# Patient Record
Sex: MALE | Race: OTHER | Employment: FULL TIME | URBAN - METROPOLITAN AREA
[De-identification: names, ages, dates, MRNs, and addresses within clinical notes are randomized per-mention and may not be internally consistent; named-entity substitution may affect disease eponyms.]

---

## 2020-04-28 RX ORDER — SODIUM CHLORIDE 0.9 % (FLUSH) 0.9 %
5-40 SYRINGE (ML) INJECTION EVERY 8 HOURS
Status: CANCELLED | OUTPATIENT
Start: 2020-04-28

## 2020-04-28 RX ORDER — SODIUM CHLORIDE 0.9 % (FLUSH) 0.9 %
5-40 SYRINGE (ML) INJECTION AS NEEDED
Status: CANCELLED | OUTPATIENT
Start: 2020-04-28

## 2020-05-05 LAB — SARS-COV-2, NAA: NOT DETECTED

## 2020-05-07 ENCOUNTER — ANESTHESIA EVENT (OUTPATIENT)
Dept: SURGERY | Age: 50
End: 2020-05-07
Payer: COMMERCIAL

## 2020-05-07 NOTE — PERIOP NOTES
Verified with [employer] that patient COVID lab result is \"not detected\". Results faxed.

## 2020-05-07 NOTE — ANESTHESIA PREPROCEDURE EVALUATION
Relevant Problems   No relevant active problems       Anesthetic History               Review of Systems / Medical History  Patient summary reviewed, nursing notes reviewed and pertinent labs reviewed    Pulmonary                   Neuro/Psych              Cardiovascular              Hyperlipidemia    Exercise tolerance: >4 METS     GI/Hepatic/Renal                Endo/Other    Diabetes: well controlled, type 2    Obesity     Other Findings            Physical Exam    Airway  Mallampati: I  TM Distance: > 6 cm  Neck ROM: normal range of motion   Mouth opening: Normal     Cardiovascular  Regular rate and rhythm,  S1 and S2 normal,  no murmur, click, rub, or gallop             Dental  No notable dental hx       Pulmonary  Breath sounds clear to auscultation               Abdominal  GI exam deferred       Other Findings            Anesthetic Plan    ASA: 3  Anesthesia type: general            Anesthetic plan and risks discussed with: Patient

## 2020-05-08 ENCOUNTER — ANESTHESIA (OUTPATIENT)
Dept: SURGERY | Age: 50
End: 2020-05-08
Payer: COMMERCIAL

## 2020-05-08 ENCOUNTER — HOSPITAL ENCOUNTER (OUTPATIENT)
Age: 50
Setting detail: OUTPATIENT SURGERY
Discharge: HOME | End: 2020-05-08
Attending: ORTHOPAEDIC SURGERY | Admitting: ORTHOPAEDIC SURGERY
Payer: COMMERCIAL

## 2020-05-08 VITALS
SYSTOLIC BLOOD PRESSURE: 141 MMHG | HEART RATE: 71 BPM | DIASTOLIC BLOOD PRESSURE: 82 MMHG | RESPIRATION RATE: 16 BRPM | TEMPERATURE: 97.6 F | OXYGEN SATURATION: 96 %

## 2020-05-08 LAB
GLUCOSE BLD STRIP.AUTO-MCNC: 180 MG/DL (ref 65–100)
POTASSIUM BLD-SCNC: 3.6 MMOL/L (ref 3.5–5.1)

## 2020-05-08 PROCEDURE — 82962 GLUCOSE BLOOD TEST: CPT

## 2020-05-08 PROCEDURE — 76010000138 HC OR TIME 0.5 TO 1 HR: Performed by: ORTHOPAEDIC SURGERY

## 2020-05-08 PROCEDURE — 74011250636 HC RX REV CODE- 250/636: Performed by: ANESTHESIOLOGY

## 2020-05-08 PROCEDURE — 77030000032 HC CUF TRNQT ZIMM -B: Performed by: ORTHOPAEDIC SURGERY

## 2020-05-08 PROCEDURE — 74011250636 HC RX REV CODE- 250/636: Performed by: ORTHOPAEDIC SURGERY

## 2020-05-08 PROCEDURE — 77030018836 HC SOL IRR NACL ICUM -A: Performed by: ORTHOPAEDIC SURGERY

## 2020-05-08 PROCEDURE — 77030027385 HC BLD SHV ARTHSCP STRY -B: Performed by: ORTHOPAEDIC SURGERY

## 2020-05-08 PROCEDURE — 77030038012 HC WND COBLATN S&N -F: Performed by: ORTHOPAEDIC SURGERY

## 2020-05-08 PROCEDURE — 74011250636 HC RX REV CODE- 250/636: Performed by: REGISTERED NURSE

## 2020-05-08 PROCEDURE — 74011250637 HC RX REV CODE- 250/637: Performed by: ANESTHESIOLOGY

## 2020-05-08 PROCEDURE — 76060000032 HC ANESTHESIA 0.5 TO 1 HR: Performed by: ORTHOPAEDIC SURGERY

## 2020-05-08 PROCEDURE — 76210000006 HC OR PH I REC 0.5 TO 1 HR: Performed by: ORTHOPAEDIC SURGERY

## 2020-05-08 PROCEDURE — 74011000250 HC RX REV CODE- 250: Performed by: REGISTERED NURSE

## 2020-05-08 PROCEDURE — 77030010509 HC AIRWY LMA MSK TELE -A: Performed by: REGISTERED NURSE

## 2020-05-08 PROCEDURE — 76210000020 HC REC RM PH II FIRST 0.5 HR: Performed by: ORTHOPAEDIC SURGERY

## 2020-05-08 PROCEDURE — 84132 ASSAY OF SERUM POTASSIUM: CPT

## 2020-05-08 PROCEDURE — 77030006891 HC BLD SHV RESECT STRY -B: Performed by: ORTHOPAEDIC SURGERY

## 2020-05-08 RX ORDER — KETOROLAC TROMETHAMINE 30 MG/ML
30 INJECTION, SOLUTION INTRAMUSCULAR; INTRAVENOUS AS NEEDED
Status: DISCONTINUED | OUTPATIENT
Start: 2020-05-08 | End: 2020-05-08 | Stop reason: HOSPADM

## 2020-05-08 RX ORDER — MIDAZOLAM HYDROCHLORIDE 1 MG/ML
2 INJECTION, SOLUTION INTRAMUSCULAR; INTRAVENOUS
Status: DISCONTINUED | OUTPATIENT
Start: 2020-05-08 | End: 2020-05-08 | Stop reason: HOSPADM

## 2020-05-08 RX ORDER — PROPOFOL 10 MG/ML
INJECTION, EMULSION INTRAVENOUS AS NEEDED
Status: DISCONTINUED | OUTPATIENT
Start: 2020-05-08 | End: 2020-05-08 | Stop reason: HOSPADM

## 2020-05-08 RX ORDER — FENTANYL CITRATE 50 UG/ML
INJECTION, SOLUTION INTRAMUSCULAR; INTRAVENOUS AS NEEDED
Status: DISCONTINUED | OUTPATIENT
Start: 2020-05-08 | End: 2020-05-08 | Stop reason: HOSPADM

## 2020-05-08 RX ORDER — ONDANSETRON 2 MG/ML
INJECTION INTRAMUSCULAR; INTRAVENOUS AS NEEDED
Status: DISCONTINUED | OUTPATIENT
Start: 2020-05-08 | End: 2020-05-08 | Stop reason: HOSPADM

## 2020-05-08 RX ORDER — NALOXONE HYDROCHLORIDE 0.4 MG/ML
0.1 INJECTION, SOLUTION INTRAMUSCULAR; INTRAVENOUS; SUBCUTANEOUS AS NEEDED
Status: DISCONTINUED | OUTPATIENT
Start: 2020-05-08 | End: 2020-05-08 | Stop reason: HOSPADM

## 2020-05-08 RX ORDER — KETOROLAC TROMETHAMINE 30 MG/ML
INJECTION, SOLUTION INTRAMUSCULAR; INTRAVENOUS AS NEEDED
Status: DISCONTINUED | OUTPATIENT
Start: 2020-05-08 | End: 2020-05-08 | Stop reason: HOSPADM

## 2020-05-08 RX ORDER — LIDOCAINE HYDROCHLORIDE 10 MG/ML
0.1 INJECTION INFILTRATION; PERINEURAL AS NEEDED
Status: DISCONTINUED | OUTPATIENT
Start: 2020-05-08 | End: 2020-05-08 | Stop reason: HOSPADM

## 2020-05-08 RX ORDER — ROPIVACAINE HYDROCHLORIDE 5 MG/ML
INJECTION, SOLUTION EPIDURAL; INFILTRATION; PERINEURAL AS NEEDED
Status: DISCONTINUED | OUTPATIENT
Start: 2020-05-08 | End: 2020-05-08 | Stop reason: HOSPADM

## 2020-05-08 RX ORDER — SODIUM CHLORIDE, SODIUM LACTATE, POTASSIUM CHLORIDE, CALCIUM CHLORIDE 600; 310; 30; 20 MG/100ML; MG/100ML; MG/100ML; MG/100ML
125 INJECTION, SOLUTION INTRAVENOUS CONTINUOUS
Status: DISCONTINUED | OUTPATIENT
Start: 2020-05-08 | End: 2020-05-08 | Stop reason: HOSPADM

## 2020-05-08 RX ORDER — DEXAMETHASONE SODIUM PHOSPHATE 4 MG/ML
INJECTION, SOLUTION INTRA-ARTICULAR; INTRALESIONAL; INTRAMUSCULAR; INTRAVENOUS; SOFT TISSUE AS NEEDED
Status: DISCONTINUED | OUTPATIENT
Start: 2020-05-08 | End: 2020-05-08 | Stop reason: HOSPADM

## 2020-05-08 RX ORDER — OXYCODONE HYDROCHLORIDE 5 MG/1
10 TABLET ORAL
Status: DISCONTINUED | OUTPATIENT
Start: 2020-05-08 | End: 2020-05-08 | Stop reason: HOSPADM

## 2020-05-08 RX ORDER — ACETAMINOPHEN 500 MG
1000 TABLET ORAL ONCE
Status: COMPLETED | OUTPATIENT
Start: 2020-05-08 | End: 2020-05-08

## 2020-05-08 RX ORDER — HYDROMORPHONE HYDROCHLORIDE 2 MG/ML
0.5 INJECTION, SOLUTION INTRAMUSCULAR; INTRAVENOUS; SUBCUTANEOUS
Status: DISCONTINUED | OUTPATIENT
Start: 2020-05-08 | End: 2020-05-08 | Stop reason: HOSPADM

## 2020-05-08 RX ORDER — LIDOCAINE HYDROCHLORIDE 20 MG/ML
INJECTION, SOLUTION EPIDURAL; INFILTRATION; INTRACAUDAL; PERINEURAL AS NEEDED
Status: DISCONTINUED | OUTPATIENT
Start: 2020-05-08 | End: 2020-05-08 | Stop reason: HOSPADM

## 2020-05-08 RX ORDER — SODIUM CHLORIDE 0.9 % (FLUSH) 0.9 %
5-40 SYRINGE (ML) INJECTION AS NEEDED
Status: DISCONTINUED | OUTPATIENT
Start: 2020-05-08 | End: 2020-05-08 | Stop reason: HOSPADM

## 2020-05-08 RX ORDER — SODIUM CHLORIDE 0.9 % (FLUSH) 0.9 %
5-40 SYRINGE (ML) INJECTION EVERY 8 HOURS
Status: DISCONTINUED | OUTPATIENT
Start: 2020-05-08 | End: 2020-05-08 | Stop reason: HOSPADM

## 2020-05-08 RX ADMIN — LIDOCAINE HYDROCHLORIDE 100 MG: 20 INJECTION, SOLUTION EPIDURAL; INFILTRATION; INTRACAUDAL; PERINEURAL at 10:52

## 2020-05-08 RX ADMIN — PROPOFOL 50 MG: 10 INJECTION, EMULSION INTRAVENOUS at 10:54

## 2020-05-08 RX ADMIN — KETOROLAC TROMETHAMINE 30 MG: 30 INJECTION, SOLUTION INTRAMUSCULAR; INTRAVENOUS at 11:15

## 2020-05-08 RX ADMIN — SODIUM CHLORIDE, SODIUM LACTATE, POTASSIUM CHLORIDE, AND CALCIUM CHLORIDE 125 ML/HR: 600; 310; 30; 20 INJECTION, SOLUTION INTRAVENOUS at 10:30

## 2020-05-08 RX ADMIN — ONDANSETRON 4 MG: 2 INJECTION INTRAMUSCULAR; INTRAVENOUS at 10:57

## 2020-05-08 RX ADMIN — ACETAMINOPHEN 1000 MG: 500 TABLET ORAL at 10:29

## 2020-05-08 RX ADMIN — FENTANYL CITRATE 25 MCG: 50 INJECTION INTRAMUSCULAR; INTRAVENOUS at 11:00

## 2020-05-08 RX ADMIN — FENTANYL CITRATE 25 MCG: 50 INJECTION INTRAMUSCULAR; INTRAVENOUS at 11:06

## 2020-05-08 RX ADMIN — FENTANYL CITRATE 25 MCG: 50 INJECTION INTRAMUSCULAR; INTRAVENOUS at 10:57

## 2020-05-08 RX ADMIN — PROPOFOL 200 MG: 10 INJECTION, EMULSION INTRAVENOUS at 10:52

## 2020-05-08 RX ADMIN — DEXAMETHASONE SODIUM PHOSPHATE 4 MG: 4 INJECTION, SOLUTION INTRAMUSCULAR; INTRAVENOUS at 10:57

## 2020-05-08 NOTE — DISCHARGE INSTRUCTIONS
POST OPERATIVE INSTRUCTIONS FOR KNEE ARTHROSCOPY    1. Unless you receive other instructions, you may weight bear as tolerated      2. Apply ice to your knee for 20-30 minutes several times per day for the first 3 days and then as needed. Icing will decrease the amount of inflammation and is helpful after exercise    3. You may remove the dressings the following day and apply waterproof band aids. Some bleeding and drainage may persist for several days following  surgery. Waterproof band aids may be used while showering and avoid tub baths for two weeks. 4. You will be given pain medications and do not drive under the influence. Some patients take pain medication at night and antiinflammatory medication during day  when pain decreases. 5. You may be given Phenergan for nausea    6. You will receive a phone call from our office notifying you of your follow up visit    7. If any problems call [phone]      DIET  · Clear liquids until no nausea or vomiting; then light diet for the first day. · Advance to regular diet on second day, unless your doctor orders otherwise. · If nausea and vomiting continues, call your doctor. PAIN  · Take pain medication as directed by your doctor. · Call your doctor if pain is NOT relieved by medication. CALL YOUR DOCTOR IF   · Excessive bleeding that does not stop after holding pressure over the area  · Temperature of 101 degrees F or above  · Excessive redness, swelling or bruising, and/ or green or yellow, smelly discharge from incision    AFTER ANESTHESIA   · For the first 24 hours: DO NOT Drive, Drink alcoholic beverages, or Make important decisions. · Be aware of dizziness following anesthesia and while taking pain medication.      APPOINTMENT DATE/ TIME his office will call you    YOUR DOCTOR'S PHONE NUMBER [phone]    DISCHARGE SUMMARY from Nurse    PATIENT INSTRUCTIONS:    After general anesthesia or intravenous sedation, for 24 hours or while taking prescription Narcotics:  · Limit your activities  · Do not drive and operate hazardous machinery  · Do not make important personal or business decisions  · Do  not drink alcoholic beverages  · If you have not urinated within 8 hours after discharge, please contact your surgeon on call. *  Please give a list of your current medications to your Primary Care Provider. *  Please update this list whenever your medications are discontinued, doses are      changed, or new medications (including over-the-counter products) are added. *  Please carry medication information at all times in case of emergency situations. These are general instructions for a healthy lifestyle:    No smoking/ No tobacco products/ Avoid exposure to second hand smoke    Surgeon General's Warning:  Quitting smoking now greatly reduces serious risk to your health. Obesity, smoking, and sedentary lifestyle greatly increases your risk for illness    A healthy diet, regular physical exercise & weight monitoring are important for maintaining a healthy lifestyle    You may be retaining fluid if you have a history of heart failure or if you experience any of the following symptoms:  Weight gain of 3 pounds or more overnight or 5 pounds in a week, increased swelling in our hands or feet or shortness of breath while lying flat in bed. Please call your doctor as soon as you notice any of these symptoms; do not wait until your next office visit. Recognize signs and symptoms of STROKE:    F-face looks uneven    A-arms unable to move or move unevenly    S-speech slurred or non-existent    T-time-call 911 as soon as signs and symptoms begin-DO NOT go       Back to bed or wait to see if you get better-TIME IS BRAIN. Advance Care Planning  People with COVID-19 may have no symptoms, mild symptoms, such as fever, cough, and shortness of breath or they may have more severe illness, developing severe and fatal pneumonia.   As a result, Advance Care Planning with attention to naming a health care decision maker (someone you trust to make healthcare decisions for you if you could not speak for yourself) and sharing other health care preferences is important BEFORE a possible health crisis. Please contact your Primary Care Provider to discuss Advance Care Planning. Preventing the Spread of Coronavirus Disease 2019 in Homes and Residential Communities  For the most recent information go to [email or web address]    Prevention steps for People with confirmed or suspected COVID-19 (including persons under investigation) who do not need to be hospitalized  and   People with confirmed COVID-19 who were hospitalized and determined to be medically stable to go home    Your healthcare provider and public health staff will evaluate whether you can be cared for at home. If it is determined that you do not need to be hospitalized and can be isolated at home, you will be monitored by staff from your local or state health department. You should follow the prevention steps below until a healthcare provider or local or state health department says you can return to your normal activities. Stay home except to get medical care  People who are mildly ill with COVID-19 are able to isolate at home during their illness. You should restrict activities outside your home, except for getting medical care. Do not go to work, school, or public areas. Avoid using public transportation, ride-sharing, or taxis. Separate yourself from other people and animals in your home  People: As much as possible, you should stay in a specific room and away from other people in your home. Also, you should use a separate bathroom, if available. Animals: You should restrict contact with pets and other animals while you are sick with COVID-19, just like you would around other people.  Although there have not been reports of pets or other animals becoming sick with COVID-19, it is still recommended that people sick with COVID-19 limit contact with animals until more information is known about the virus. When possible, have another member of your household care for your animals while you are sick. If you are sick with COVID-19, avoid contact with your pet, including petting, snuggling, being kissed or licked, and sharing food. If you must care for your pet or be around animals while you are sick, wash your hands before and after you interact with pets and wear a facemask. Call ahead before visiting your doctor  If you have a medical appointment, call the healthcare provider and tell them that you have or may have COVID-19. This will help the healthcare providers office take steps to keep other people from getting infected or exposed. Wear a facemask  You should wear a facemask when you are around other people (e.g., sharing a room or vehicle) or pets and before you enter a healthcare providers office. If you are not able to wear a facemask (for example, because it causes trouble breathing), then people who live with you should not stay in the same room with you, or they should wear a facemask if they enter your room. Cover your coughs and sneezes  Cover your mouth and nose with a tissue when you cough or sneeze. Throw used tissues in a lined trash can. Immediately wash your hands with soap and water for at least 20 seconds or, if soap and water are not available, clean your hands with an alcohol-based hand  that contains at least 60% alcohol. Clean your hands often  Wash your hands often with soap and water for at least 20 seconds, especially after blowing your nose, coughing, or sneezing; going to the bathroom; and before eating or preparing food. If soap and water are not readily available, use an alcohol-based hand  with at least 60% alcohol, covering all surfaces of your hands and rubbing them together until they feel dry.   Soap and water are the best option if hands are visibly dirty. Avoid touching your eyes, nose, and mouth with unwashed hands. Avoid sharing personal household items  You should not share dishes, drinking glasses, cups, eating utensils, towels, or bedding with other people or pets in your home. After using these items, they should be washed thoroughly with soap and water. Clean all high-touch surfaces everyday  High touch surfaces include counters, tabletops, doorknobs, bathroom fixtures, toilets, phones, keyboards, tablets, and bedside tables. Also, clean any surfaces that may have blood, stool, or body fluids on them. Use a household cleaning spray or wipe, according to the label instructions. Labels contain instructions for safe and effective use of the cleaning product including precautions you should take when applying the product, such as wearing gloves and making sure you have good ventilation during use of the product. Monitor your symptoms  Seek prompt medical attention if your illness is worsening (e.g., difficulty breathing). Before seeking care, call your healthcare provider and tell them that you have, or are being evaluated for, COVID-19. Put on a facemask before you enter the facility. These steps will help the healthcare providers office to keep other people in the office or waiting room from getting infected or exposed. Ask your healthcare provider to call the local or state health department. Persons who are placed under active monitoring or facilitated self-monitoring should follow instructions provided by their local health department or occupational health professionals, as appropriate. When working with your local health department check their available hours. If you have a medical emergency and need to call 911, notify the dispatch personnel that you have, or are being evaluated for COVID-19. If possible, put on a facemask before emergency medical services arrive.   Discontinuing home isolation  Patients with confirmed COVID-19 should remain under home isolation precautions until the risk of secondary transmission to others is thought to be low. The decision to discontinue home isolation precautions should be made on a case-by-case basis, in consultation with healthcare providers and state and local health departments.

## 2020-05-08 NOTE — OP NOTES
[facility]  OPERATIVE REPORT    Name:  Yao Noyola  MR#:  [id number]  :  1970  ACCOUNT #:  [de-identified]  DATE OF SERVICE:  2020    PREOPERATIVE DIAGNOSIS:  Tear of the meniscus of the left knee. POSTOPERATIVE DIAGNOSES:  1. Tear of the lateral meniscus of the left knee. 2.  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 3.  Osteophyte in the intercondylar notch. PROCEDURE PERFORMED:  1. Arthroscopic abrasion arthroplasty of left knee, [id number].  2.  Lateral meniscectomy, [id number].  3.  Excision of osteophyte in the intercondylar notch. SURGEON:  Whit Santana MD    ASSISTANT:  None. ANESTHESIA:  General.    COMPLICATIONS:  None. SPECIMENS REMOVED:  None. IMPLANTS:  None. ESTIMATED BLOOD LOSS:  Minimal.    PROCEDURE:  After an adequate level of general anesthesia was obtained, the patient's left knee was prepped and draped in the usual sterile fashion. Overall, he had good stability of the knee. Anteromedial and anterolateral portals made. Findings through the anteromedial and anterolateral portals arthroscopically revealed grade II with small area of grade III chondromalacia involving the femoral sulcus at the patellofemoral joint. Some loose debris was removed from the joint, but no large loose bodies. He previously had an ACL reconstruction. His ACL looked good and there was osteophyte anteriorly which was causing some impingement on the tibia and this was removed with a bur. In the lateral compartment, the patient had a large tear of the lateral meniscus. He previously had a repair of the posterior third and the stitch was in place, but he had marked tearing of the rest of the meniscus and the tear was resected with the baskets and shaver. He was getting degenerative changes in the lateral compartment grade II, small areas of grade III chondromalacia.   The knee was then copiously irrigated and the gutters were clear. Sterile dressings were then applied. Tolerated the procedure well.       Tabitha Neumann MD      [id number]  D:  05/08/2020 11:22  T:  05/08/2020 11:56  JOB #:  [id number]  CC:  [address] [place] [address]

## 2020-05-08 NOTE — H&P
Outpatient Surgery History and Physical:  Ruben Mckeon was seen and examined. CHIEF COMPLAINT:   LT knee     PE:   There were no vitals taken for this visit. Heart:   Regular rhythm      Lungs:  Are clear      Past Medical History: There are no active problems to display for this patient. Surgical History:   Past Surgical History:   Procedure Laterality Date    HX ORTHOPAEDIC Left 2012    ACL repair    HX ORTHOPAEDIC Right 2004    knee surg       Social History: Patient  reports that he has never smoked. He has never used smokeless tobacco. He reports current alcohol use. He reports that he does not use drugs. Family History:   Family History   Problem Relation Age of Onset    Diabetes Mother     Diabetes Brother        Allergies: Reviewed per EMR  No Known Allergies    Medications:    No current facility-administered medications on file prior to encounter. No current outpatient medications on file prior to encounter. The surgery is planned for the  LT knee. History and physical has been reviewed. The patient has been examined. There have been no significant clinical changes since the completion of the originally dated History and Physical.  Patient identified by surgeon; surgical site was confirmed by patient and surgeon. The patient is here today for outpatient surgery. I have examined the patient, no changes are noted in the patient's medical status. Necessity for the procedure/care is still present and the history and physical above is current. See the office notes for the full long term history of the problem. Please see the recent office notes for the musculoskeletal examination.     Signed By: Gale Matias MD     May 8, 2020 7:17 AM

## 2020-05-08 NOTE — PROGRESS NOTES
Hospital  has been requested and is available for over-the-phone language services. Please call [phone] ([phone]) for all requests.       Cary Zabala  CERTIFIED HEALTHCARE INTERPRETERTM ([facility] [place] [place]  Language Services Department   [address]  [phone] (phone)

## 2020-05-08 NOTE — ANESTHESIA POSTPROCEDURE EVALUATION
Procedure(s):  LEFT KNEE ARTHROSCOPY WITH MEDIAL AND LATERAL MENISCECTOMY, ABRASION ARTHROPLASTY. general    Anesthesia Post Evaluation        Patient location during evaluation: PACU  Patient participation: complete - patient participated  Level of consciousness: responsive to verbal stimuli  Pain management: adequate  Airway patency: patent  Anesthetic complications: no  Cardiovascular status: acceptable  Respiratory status: acceptable  Hydration status: euvolemic        Vitals Value Taken Time   /82 5/8/2020 12:03 PM   Temp 36.4 °C (97.6 °F) 5/8/2020 11:28 AM   Pulse 72 5/8/2020 12:10 PM   Resp 12 5/8/2020 11:49 AM   SpO2 96 % 5/8/2020 12:10 PM   Vitals shown include unvalidated device data.

## 2021-09-24 ENCOUNTER — HOSPITAL ENCOUNTER (OUTPATIENT)
Dept: PHYSICAL THERAPY | Age: 51
Discharge: HOME | End: 2021-09-24
Attending: ORTHOPAEDIC SURGERY

## 2021-09-24 NOTE — PROGRESS NOTES
Ceferino Elmore  : 1970  Primary: [facility]  Secondary:  [facility], [address] [address], [address] [facility]  [phone]   [phone]          OUTPATIENT DAILY NOTE    NAME/AGE/GENDER: Ceferino Elmoer is a 49 y.o. male. DATE: 2021    Patient cancelled (less than 24 hours ago) his initial evaluation appointment today due to not having authorization from worker's comp adjustor. Will plan to follow up on next scheduled visit.     Mehul Goddard, PT    Future Appointments   Date Time Provider Burke Vu   10/13/2021 10:00 AM Amy Simpson MD [facility]

## 2021-09-27 ENCOUNTER — HOSPITAL ENCOUNTER (OUTPATIENT)
Dept: PHYSICAL THERAPY | Age: 51
Discharge: HOME | End: 2021-09-27
Attending: ORTHOPAEDIC SURGERY
Payer: OTHER MISCELLANEOUS

## 2021-09-27 PROCEDURE — 97110 THERAPEUTIC EXERCISES: CPT

## 2021-09-27 PROCEDURE — 97162 PT EVAL MOD COMPLEX 30 MIN: CPT

## 2021-09-27 PROCEDURE — 97140 MANUAL THERAPY 1/> REGIONS: CPT

## 2021-09-27 NOTE — PROGRESS NOTES
Lexi Jump  : 1970  Primary: [facility]  Secondary:  [address]  at [address], [address] [address], [place] [address].  [phone]   [phone]         OUTPATIENT PHYSICAL THERAPY: Daily Treatment Note 2021  Visit Count:  1    ICD-10: Treatment Diagnosis: Pain in right lower leg (M79.661)  Precautions/Allergies:   Patient has no known allergies. TREATMENT PLAN:  Effective Dates: 2021 TO 2021 (90 days). Frequency/Duration: 2 times a week for 90 Day(s)    Pre-treatment Symptoms/Complaints:  R lower leg pain  Pain: Initial:   8/10 Post Session:  8/10   Medications Last Reviewed:  2021  Updated Objective Findings:  See evaluation note from today  TREATMENT:     THERAPEUTIC EXERCISE: (8 minutes):  Exercises per grid below to improve mobility and strength. Required minimal verbal cues to promote proper body alignment and promote proper body posture. Progressed resistance and repetitions as indicated. MANUAL THERAPY: (15 minutes): Soft tissue work through [employer] and soleus was utilized and necessary because of the patient's restricted motion of soft tissue. Date:  21 Date:   Date:     Activity/Exercise Parameters Parameters Parameters   Standing Gastroc Stretch 3 reps  20 sec holds     Standing Soleus Stretch 3 reps  20 sec holds                                       [facility]  Treatment/Session Summary:    Response to Treatment:  Pt tolerated all treatments well today with no c/o. Communication/Consultation:  None today  Equipment provided today:  None today  Recommendations/Intent for next treatment session: Next visit will focus on progression of stretching/strengthening exercises as tolerable.     Total Treatment Billable Duration:  23 minutes  PT Patient Time In/Time Out  Time In: 1500  Time Out: [address] PT    Future Appointments   Date Time Provider Burke Vu   10/5/2021  8:45 AM Shara Shed, PT [facility]   10/7/2021  9:30 AM [place] Shed, PT [facility]   10/12/2021  3:15 PM Amber Saeed, PTA [facility]   10/13/2021 10:00 AM Richard Mcgill MD [facility]   10/14/2021  3:15 PM [place] Shed, PT [facility]   10/19/2021  3:15 PM Amber Saeed, PTA [facility]   10/21/2021  3:15 PM [place] Shed, PT [facility]   10/26/2021  3:15 PM Amber Hernandez, PTA [facility]   10/28/2021  9:30 AM Amber Hernandez, PTA [facility]

## 2021-09-27 NOTE — THERAPY EVALUATION
Royd Mendenhall  : 1970  Primary: [facility]  Secondary:  [address] at [place] [address],[address], [place] [address].  [phone]   [phone]           Henrietta 53 Assessment 2021   ICD-10: Treatment Diagnosis: Pain in right lower leg (M79.661)  Precautions/Allergies:   Patient has no known allergies. TREATMENT PLAN:  Effective Dates: 2021 TO 2021 (90 days). Frequency/Duration: 2 times a week for 90 Day(s) MEDICAL/REFERRING DIAGNOSIS:  pt   DATE OF ONSET: 21  REFERRING PHYSICIAN: Torri Boo MD MD Orders: Evaluate and Treat  Return MD Appointment: Unknown at this time     INITIAL ASSESSMENT:  Mr. Jonathan Camargo presents with decreased R ankle ROM, decreased R ankle strength and increased pain leading to decreased functional status. Pt would benefit from skilled physical therapy services to address the above deficits and help patient return to prior level of function.        PROBLEM LIST (Impacting functional limitations):  Decreased Strength  Decreased ADL/Functional Activities  Increased Pain  Decreased Flexibility/Joint Mobility  Decreased [facility] with Home Exercise Program INTERVENTIONS PLANNED: (Treatment may consist of any combination of the following)  Balance Exercise  Cold  Cryotherapy  Electrical Stimulation  Heat  Home Exercise Program (HEP)  Manual Therapy  Range of Motion (ROM)  Therapeutic Exercise/Strengthening  Ultrasound (US)     GOALS: (Goals have been discussed and agreed upon with patient.)  Short-Term Functional Goals: Time Frame: 4 weeks  Pt will increase ROM R ankle DF = 10 degrees to assist with daily activities  Pt will increase LEFS score to 70 or above to show overall improvement in function  Pt will be independent with HEP  Discharge Goals: Time Frame: 12 weeks  Pt will increase strength R ankle 5/5 to assist with daily activities  Pt will ascend/descend 10 eight-inch steps independently using a reciprocal pattern with no c/o R leg pain  Pt will work 8 hr workday full duty with no c/o R leg pain    OUTCOME MEASURE:   Tool Used: Lower Extremity Functional Scale (LEFS)  Score:  Initial: 60/80 Most Recent: X/80 (Date: -- )   Interpretation of Score: 20 questions each scored on a 5 point scale with 0 representing \"extreme difficulty or unable to perform\" and 4 representing \"no difficulty\". The lower the score, the greater the functional disability. 80/80 represents no disability. Minimal detectable change is 9 points. MEDICAL NECESSITY:   Patient is expected to demonstrate progress in strength, range of motion and functional technique to increase independence with work and daily activities. Patient demonstrates good rehab potential due to higher previous functional level. REASON FOR SERVICES/OTHER COMMENTS:  Patient continues to require skilled intervention due to decreased ROM/strength R ankle with increased pain leading to decreased functional status. Total Duration:  PT Patient Time In/Time Out  Time In: 1500  Time Out: 1550    Rehabilitation Potential For Stated Goals: Good  Regarding Pradip Lovell's therapy, I certify that the treatment plan above will be carried out by a therapist or under their direction. Thank you for this referral,  Sheryl Guevara, PT     Referring Physician Signature: Veronica Grossman MD _______________________________ Date _____________      PAIN/SUBJECTIVE:   Initial:   8/10 Post Session:  8/10   HISTORY:   History of Injury/Illness (Reason for Referral):  Pt states 07-19-21 he was at work when he fell. He states there was a leak and he slipped on wet tile and fell onto his R leg. He had surgery on his L knee last year and was trying to protect his L knee and injured his R leg. Pt had a doppler ultrasound which was negative for DVT/blood clot.   He had an MRI which revealed a small superficial tear of the lateral aspect of the soleus muscle belly. Aggravating factors include prolonged standing and walking. Relieving factors include massage. Pt rates his current R leg pain 8/10 sitting at rest.  He works in maintenance for [employer]. He is using [employer] which he states helps some. Pt states he is doing his regular work, but has increased pain with his work activities. Pt states his pain is slowly improving. Pt is taking Naproxen prn for his R lower leg pain. He states he occasionally gets pain into the R knee and ankle. Past Medical History/Comorbidities:   Mr. Mina Womack  has a past medical history of Diabetes ([facility] [address] Hypercholesterolemia, Hyperlipemia, Left knee pain, and Nausea & vomiting. Mr. Mina Womack  has a past surgical history that includes hx orthopaedic; hx orthopaedic (Left, 2012); and hx orthopaedic (Right, 2004). Social History/Living Environment:     Pt lives in a 3rd floor apartment and has some mild pain with ascending/descending stairs  Prior Level of Function/Work/Activity:  Pt works in maintenance for [employer]:         RIGHT  Other Clinical Tests:          MRI revealed superficial tear of R soleus   Personal Factors:          Sex:  male        Age:  49 y.o. Profession:  Pt works in maintenance for [employer]   Ambulatory/Rehab [address] [place] [address] Risk Assessment   Risk Factors:       (1)  Gender [Male]       (5)  History of Recent Falls [w/in 3 months] Ability to Rise from Chair:       (0)  Ability to rise in a single movement   Falls Prevention Plan:       Exercise/Equipment Adaptation (specify):  SBA to CGA during standing exercise   Total: (5 or greater = High Risk): 6   ©2010 [facility] [address] [place] [address] States Patent #[id number].  Federal Law prohibits the replication, distribution or use without written permission from [facility] [employer]   Current Medications:       Current Outpatient Medications:     naproxen (NAPROSYN) 500 mg tablet, Take 500 mg by mouth two (2) times daily as needed. , Disp: , Rfl:     insulin detemir U-100 (Levemir U-100 Insulin) 100 unit/mL injection, 60 Units by SubCUTAneous route nightly., Disp: , Rfl:     metFORMIN (GLUCOPHAGE) 500 mg tablet, Take 1,000 mg by mouth two (2) times daily (with meals). , Disp: , Rfl:     insulin aspart U-100 (NovoLOG Flexpen U-100 Insulin) 100 unit/mL (3 mL) inpn, by SubCUTAneous route Before breakfast, lunch, and dinner. 6 units in am 10 units at lunch 5-10 units with dinner, Disp: , Rfl:     lisinopril-hydroCHLOROthiazide (PRINZIDE, ZESTORETIC) 20-12.5 mg per tablet, Take 1 Tab by mouth daily. , Disp: , Rfl:     amLODIPine (NORVASC) 10 mg tablet, Take 10 mg by mouth daily. , Disp: , Rfl:     atorvastatin (LIPITOR) 20 mg tablet, Take 20 mg by mouth nightly., Disp: , Rfl:     glipiZIDE SR (GLUCOTROL XL) 5 mg CR tablet, Take 1 tablet by mouth daily. , Disp: 30 tablet, Rfl: 0    metFORMIN (GLUCOPHAGE) 500 mg tablet, Take 1 tablet by mouth two (2) times daily (with meals). , Disp: 60 tablet, Rfl: 0   Date Last Reviewed:  09-27-21   Number of Personal Factors/Comorbidities that affect the Plan of Care: 1-2: MODERATE COMPLEXITY   EXAMINATION:   Observation/Orthostatic Postural Assessment:           Forward head, rounded shoulders  Palpation:          Tenderness and hypertonicity R gastroc/soleus  ROM:    R knee extension = 0 degrees  R knee flexion = 125 degrees    L knee extension = 0 degrees  L knee flexion = 125 degrees    R ankle DF = 4 degrees  R ankle PF = 60 degrees  R ankle IV = 40 degrees  R ankle EV = 30 degrees    L ankle DF = 10 degrees  L ankle PF = 60 degrees  L ankle IV = 45 degrees  L ankle EV = 35 degrees    Strength:    R knee extension = 5/5  R knee flexion = 5/5    L knee extension = 5/5  L knee flexion = 5/5    R ankle DF = 4+/5  R ankle PF = 4+/5  R ankle IV = 4+/5  R ankle EV = 4+/5    L ankle DF = 5/5  L ankle PF = 5/5  L ankle IV = 5/5  L ankle EV = 5/5    Neurological Screen: Sensation:  Intact to light touch R LE  Functional Mobility:         Gait/Ambulation:  Pt walks with minimal antalgic gait        Transfers:  Pt able to transition sit to supine and supine to sit independently    Body Structures Involved:  Muscles Body Functions Affected:  Sensory/Pain  Neuromusculoskeletal Activities and Participation Affected:   Mobility  Domestic Life   Number of elements (examined above) that affect the Plan of Care: 3: MODERATE COMPLEXITY   CLINICAL PRESENTATION:   Presentation: Evolving clinical presentation with changing clinical characteristics: MODERATE COMPLEXITY   CLINICAL DECISION MAKING:   Use of outcome tool(s) and clinical judgement create a POC that gives a: Questionable prediction of patient's progress: MODERATE COMPLEXITY

## 2021-10-05 ENCOUNTER — HOSPITAL ENCOUNTER (OUTPATIENT)
Dept: PHYSICAL THERAPY | Age: 51
Discharge: HOME | End: 2021-10-05
Attending: ORTHOPAEDIC SURGERY
Payer: OTHER MISCELLANEOUS

## 2021-10-05 PROCEDURE — 97110 THERAPEUTIC EXERCISES: CPT

## 2021-10-05 PROCEDURE — 97035 APP MDLTY 1+ULTRASOUND EA 15: CPT

## 2021-10-05 PROCEDURE — 97140 MANUAL THERAPY 1/> REGIONS: CPT

## 2021-10-05 NOTE — PROGRESS NOTES
Suann Jump  : 1970  Primary: Gil Rojas [employer]*  Secondary:  Therapy Center at [facility]  [address], [address], [place] [address].  [phone]   [phone]         OUTPATIENT PHYSICAL THERAPY: Daily Treatment Note 10/5/2021  Visit Count:  2    ICD-10: Treatment Diagnosis: Pain in right lower leg (M79.661)  Precautions/Allergies:   Patient has no known allergies. TREATMENT PLAN:  Effective Dates: 2021 TO 2021 (90 days). Frequency/Duration: 2 times a week for 90 Day(s)    Pre-treatment Symptoms/Complaints:  R lower leg pain; Pt states his leg is feeling a little better. Pain: Initial:   7/10 Post Session:  8/10   Medications Last Reviewed:  10/5/2021  Updated Objective Findings:  None Today  TREATMENT:     THERAPEUTIC EXERCISE: (20 minutes):  Exercises per grid below to improve mobility and strength. Required minimal verbal cues to promote proper body alignment and promote proper body posture. Progressed resistance and repetitions as indicated. MANUAL THERAPY: (15 minutes): Soft tissue work through [employer] and soleus was utilized and necessary because of the patient's restricted motion of soft tissue. MODALITIES: (8 minutes):      Ultrasound to R calf x8 minutes @1.5 W/cm2. Skin clear afterwards. Date:  21 Date:  10-05-21 Date:     Activity/Exercise Parameters Parameters Parameters   Standing Gastroc Stretch 3 reps  20 sec holds On Slantboard  3 reps  30 sec holds    Standing Soleus Stretch 3 reps  20 sec holds     Airdyne  7 minutes    Standing Heel/Toe Raises  20 reps each    SLS R LE  On Blue Foam  5 reps to Fatigue              Last Size Portal  Treatment/Session Summary:    Response to Treatment:  Pt tolerated all treatments well today with no c/o. Decreased pain following treatments today.   Communication/Consultation:  None today  Equipment provided today:  None today  Recommendations/Intent for next treatment session: Next visit will focus on progression of stretching/strengthening exercises as tolerable.     Total Treatment Billable Duration:  43 minutes  PT Patient Time In/Time Out  Time In: 0845  Time Out: Aryan Ansari PT    Future Appointments   Date Time Provider Burke Vu   10/7/2021  9:30 AM Kim Louie, PT [facility] SFE   10/12/2021  3:15 PM Aissatou Recinos, PTA [facility]   10/13/2021 10:00 AM Fouzia Dash MD [facility]   10/14/2021  3:15 PM Kim Louie, PT [facility]   10/19/2021  3:15 PM Ruthine Grisel, PTA [facility]   10/21/2021  3:15 PM Kim Louie, PT [facility]   10/26/2021  3:15 PM Aissatou Recinos, PTA [facility]   10/28/2021  9:30 AM Elnita Hake, Sheryle Dell, PTA [facility]

## 2021-10-07 ENCOUNTER — HOSPITAL ENCOUNTER (OUTPATIENT)
Dept: PHYSICAL THERAPY | Age: 51
Discharge: HOME | End: 2021-10-07
Attending: ORTHOPAEDIC SURGERY
Payer: OTHER MISCELLANEOUS

## 2021-10-07 PROCEDURE — 97140 MANUAL THERAPY 1/> REGIONS: CPT

## 2021-10-07 PROCEDURE — 97035 APP MDLTY 1+ULTRASOUND EA 15: CPT

## 2021-10-07 PROCEDURE — 97110 THERAPEUTIC EXERCISES: CPT

## 2021-10-07 NOTE — PROGRESS NOTES
Ho Rojas  : 1970  Primary: Elva Castillo Generic Workers Compens*  Secondary:  Therapy Center at [address] [place] [address],[address], [address] [facility]  [phone]   [phone]         OUTPATIENT PHYSICAL THERAPY: Daily Treatment Note 10/7/2021  Visit Count:  3    ICD-10: Treatment Diagnosis: Pain in right lower leg (M79.661)  Precautions/Allergies:   Patient has no known allergies. TREATMENT PLAN:  Effective Dates: 2021 TO 2021 (90 days). Frequency/Duration: 2 times a week for 90 Day(s)    Pre-treatment Symptoms/Complaints:  R lower leg pain; Pt states his leg is feeling a little better. He states most of his pain today is lower in the calf region near achilles tendon. Pain: Initial:  1/10 Post Session:  1/10   Medications Last Reviewed:  10/7/2021  Updated Objective Findings:  None Today  TREATMENT:     THERAPEUTIC EXERCISE: (20 minutes):  Exercises per grid below to improve mobility and strength. Required minimal verbal cues to promote proper body alignment and promote proper body posture. Progressed resistance and repetitions as indicated. MANUAL THERAPY: (15 minutes): Soft tissue work through [employer] and soleus was utilized and necessary because of the patient's restricted motion of soft tissue. MODALITIES: (8 minutes):      Ultrasound to R calf x8 minutes @1.5 W/cm2. Skin clear afterwards.       Date:  21 Date:  10-05-21 Date:  10-07-21   Activity/Exercise Parameters Parameters Parameters   Standing Gastroc Stretch 3 reps  20 sec holds On Slantboard  3 reps  30 sec holds On Slantboard  3 reps  30 sec holds   Standing Soleus Stretch 3 reps  20 sec holds     Airdyne  7 minutes 7 minutes   Standing Heel/Toe Raises  20 reps each 20 reps each   SLS R LE  On Blue Foam  5 reps to Fatigue On Blue Foam  5 reps to Fatigue   T-band Ankle DF, PF, IV and EV   Green T-band  20 reps each   Kinesiotaping Y-Strip for Inhibition R Gastoc          MedBridge Portal  Treatment/Session Summary:    Response to Treatment:  Pt tolerated all treatments well today with no c/o. Pt to remove kinesiotape in 24 hrs. If any irritation occurs he is to remove it immediately. Communication/Consultation:  None today  Equipment provided today:  None today  Recommendations/Intent for next treatment session: Next visit will focus on progression of stretching/strengthening exercises as tolerable.     Total Treatment Billable Duration:  43 minutes  PT Patient Time In/Time Out  Time In: 0930  Time Out: Gabrielle 12, PT   Visit # Therapist initials Date Arrived NS/ Cx < 24 hr >24 hr Cx Visit Comments   1  09-27-21 X   12 visits authorized   2  10-05-21 X      3  10-07-21 X                                                                                                                                               Abbreviations: NS = No Show; CX = cancelled      Future Appointments   Date Time Provider Burke Vu   10/12/2021  3:15 PM Nicolas Caballero, PTA [facility]   10/13/2021 10:00 AM Alana Pérez MD [facility]   10/14/2021  3:15 PM Marquise James, PT [facility]   10/19/2021  3:15 PM Nicolas Ada, PTA [facility]   10/21/2021  3:15 PM Marquise James, PT [facility]   10/26/2021  3:15 PM Nicolas Ada, PTA [facility]   10/28/2021  9:30 AM Jayna Miner, PTA [facility]

## 2021-10-12 ENCOUNTER — HOSPITAL ENCOUNTER (OUTPATIENT)
Dept: PHYSICAL THERAPY | Age: 51
Discharge: HOME | End: 2021-10-12
Attending: ORTHOPAEDIC SURGERY
Payer: OTHER MISCELLANEOUS

## 2021-10-12 PROCEDURE — 97035 APP MDLTY 1+ULTRASOUND EA 15: CPT

## 2021-10-12 PROCEDURE — 97140 MANUAL THERAPY 1/> REGIONS: CPT

## 2021-10-12 PROCEDURE — 97110 THERAPEUTIC EXERCISES: CPT

## 2021-10-12 NOTE — PROGRESS NOTES
Alois Scheuermann  : 1970  Primary: Mohsen Zelaya Generic Workers Compens*  Secondary:  Therapy Center at [place] [id number] [facility], [address] [place] [address],[address], [facility] [address]  [phone]   [phone]         OUTPATIENT PHYSICAL THERAPY: Daily Treatment Note 10/12/2021  Visit Count:  4    ICD-10: Treatment Diagnosis: Pain in right lower leg (M79.661)  Precautions/Allergies:   Patient has no known allergies. TREATMENT PLAN:  Effective Dates: 2021 TO 2021 (90 days). Frequency/Duration: 2 times a week for 90 Day(s)    Pre-treatment Symptoms/Complaints:  R lower leg pain; It is doing better  Pain: Initial:  1/10 Post Session:  1/10   Medications Last Reviewed:  10/12/2021  Updated Objective Findings:  None Today  TREATMENT:     THERAPEUTIC EXERCISE: (20 minutes):  Exercises per grid below to improve mobility and strength. Required minimal verbal cues to promote proper body alignment and promote proper body posture. Progressed resistance and repetitions as indicated. MANUAL THERAPY: (15 minutes): Soft tissue work through [employer] and soleus was utilized and necessary because of the patient's restricted motion of soft tissue. MODALITIES: (8 minutes):      Ultrasound to R calf x8 minutes @1.5 W/cm2. Skin clear afterwards. Date:  10-05-21 Date:  10-12-21   Activity/Exercise Parameters Parameters   Standing Gastroc Stretch On Slantboard  3 reps  30 sec holds On Slantboard  3 reps  30 sec holds   Standing Soleus Stretch  On Slantboard 3 reps 30 sec holds   Airdyne 7 minutes 7 minutes   Standing Heel/Toe Raises 20 reps each 20 reps each   SLS R LE On Blue Foam  5 reps to Fatigue On Blue Foam  5 reps to Fatigue   T-band Ankle DF, PF, IV and EV  Green T-band  20 reps each   Kinesiotaping Y-Strip for Inhibition R Gastoc  Black-       MedBridge Portal  Treatment/Session Summary:    Response to Treatment:  Pt tolerated all treatments well today with no c/o.     Communication/Consultation: None today  Equipment provided today:  None today  Recommendations/Intent for next treatment session: Next visit will focus on progression of stretching/strengthening exercises as tolerable.     Total Treatment Billable Duration:  43 minutes  PT Patient Time In/Time Out  Time In: 1515  Time Out: 300 Christelle Ponce, PTA   Visit # Therapist initials Date Arrived NS/ Cx < 24 hr >24 hr Cx Visit Comments   1  09-27-21 X   12 visits authorized   2  10-05-21 X      3  10-07-21 X                                                                                                                                               Abbreviations: NS = No Show; CX = cancelled      Future Appointments   Date Time Provider Burke Vu   10/12/2021  3:15 PM Patricia Fox, PTA [facility]   10/13/2021 10:00 AM Jocelynn Monteiro MD [facility]   10/14/2021  3:15 PM Shiloh Wharton, PT [facility]   10/19/2021  3:15 PM Patricia Fox, PTA [facility]   10/21/2021  3:15 PM Shiloh Wharotn, PT [facility]   10/26/2021  3:15 PM Patricia Fox, PTA [facility]   10/28/2021  9:30 AM Marilin Willingham, PTA [facility]

## 2021-10-14 ENCOUNTER — HOSPITAL ENCOUNTER (OUTPATIENT)
Dept: PHYSICAL THERAPY | Age: 51
Discharge: HOME | End: 2021-10-14
Attending: ORTHOPAEDIC SURGERY
Payer: OTHER MISCELLANEOUS

## 2021-10-14 PROCEDURE — 97110 THERAPEUTIC EXERCISES: CPT

## 2021-10-14 PROCEDURE — 97140 MANUAL THERAPY 1/> REGIONS: CPT

## 2021-10-14 PROCEDURE — 97035 APP MDLTY 1+ULTRASOUND EA 15: CPT

## 2021-10-14 NOTE — PROGRESS NOTES
Chirag Dastefania  : 1970  Primary: Erika Callahan Generic Workers Compens*  Secondary:  Therapy Center at [address] [place] [address], [address] [place] [address],[address], [place] [address].  [phone]   [phone]         OUTPATIENT PHYSICAL THERAPY: Daily Treatment Note 10/14/2021  Visit Count:  5    ICD-10: Treatment Diagnosis: Pain in right lower leg (M79.661)  Precautions/Allergies:   Patient has no known allergies. TREATMENT PLAN:  Effective Dates: 2021 TO 2021 (90 days). Frequency/Duration: 2 times a week for 90 Day(s)    Pre-treatment Symptoms/Complaints:  R lower leg pain; Pt states his pain is getting better. Pain: Initial:  1/10 Post Session:  1/10   Medications Last Reviewed:  10/14/2021  Updated Objective Findings:  None Today  TREATMENT:     THERAPEUTIC EXERCISE: (20 minutes):  Exercises per grid below to improve mobility and strength. Required minimal verbal cues to promote proper body alignment and promote proper body posture. Progressed resistance and repetitions as indicated. MANUAL THERAPY: (15 minutes): Soft tissue work through [employer] and soleus was utilized and necessary because of the patient's restricted motion of soft tissue. MODALITIES: (8 minutes):      Ultrasound to R calf x8 minutes @1.5 W/cm2. Skin clear afterwards.       Date:  10-05-21 Date:  10-12-21 Date:  10-14-21   Activity/Exercise Parameters Parameters    Standing Gastroc Stretch On Slantboard  3 reps  30 sec holds On Slantboard  3 reps  30 sec holds On Slantboard  3 reps  30 sec holds   Standing Soleus Stretch  On Slantboard 3 reps 30 sec holds On Slantboard  3 reps  30 sec holds   Airdyne 7 minutes 7 minutes 7 minutes   Standing Heel/Toe Raises 20 reps each 20 reps each 20 reps each   SLS R LE On Blue Foam  5 reps to Fatigue On Blue Foam  5 reps to Fatigue On Blue Foam  5 reps to Fatigue   T-band Ankle DF, PF, IV and EV  Green T-band  20 reps each    Kinesiotaping Y-Strip for Inhibition R General Mishra Black- 5 minutes       imagine Portal  Treatment/Session Summary:    Response to Treatment:  Pt tolerated all treatments well today with no c/o. Pain improving. Communication/Consultation:  None today  Equipment provided today:  None today  Recommendations/Intent for next treatment session: Next visit will focus on progression of stretching/strengthening exercises as tolerable.     Total Treatment Billable Duration:  43 minutes  PT Patient Time In/Time Out  Time In: 0932  Time Out: [address] [facility], PT   Visit # Therapist initials Date Arrived NS/ Cx < 24 hr >24 hr Cx Visit Comments   1  09-27-21 X   12 visits authorized   2  10-05-21 X      3  10-07-21 X      4  10-14-21 X                                                                                                                                      Abbreviations: NS = No Show; CX = cancelled      Future Appointments   Date Time Provider Burke Vu   10/19/2021  3:15 PM Lanette Salcedo PTA [facility] SFE   10/20/2021  8:45 AM Maurice Gutierrez MD [facility] POA   10/21/2021  3:15 PM Best Hampton PT [facility]   10/26/2021  3:15 PM Lanette Salcedo PTA [facility]   10/28/2021  9:30 AM Elena Valle, JUSTEN [facility] S[facility]

## 2021-10-19 ENCOUNTER — HOSPITAL ENCOUNTER (OUTPATIENT)
Dept: PHYSICAL THERAPY | Age: 51
Discharge: HOME | End: 2021-10-19
Attending: ORTHOPAEDIC SURGERY
Payer: OTHER MISCELLANEOUS

## 2021-10-19 PROCEDURE — 97110 THERAPEUTIC EXERCISES: CPT

## 2021-10-19 PROCEDURE — 97140 MANUAL THERAPY 1/> REGIONS: CPT

## 2021-10-19 PROCEDURE — 97035 APP MDLTY 1+ULTRASOUND EA 15: CPT

## 2021-10-19 NOTE — PROGRESS NOTES
Danay Pearl  : 1970  Primary: Stephanie Yao Generic Workers Compens*  Secondary:  Therapy Center at [address] [place] [address],[address], [place] [address].  [phone]   [phone]         OUTPATIENT PHYSICAL THERAPY: Daily Treatment Note 10/19/2021  Visit Count:  6    ICD-10: Treatment Diagnosis: Pain in right lower leg (M79.661)  Precautions/Allergies:   Patient has no known allergies. TREATMENT PLAN:  Effective Dates: 2021 TO 2021 (90 days). Frequency/Duration: 2 times a week for 90 Day(s)    Pre-treatment Symptoms/Complaints:  R lower leg pain; Pt states his pain is getting better. Pain: Initial:  1/10 Post Session:  1/10   Medications Last Reviewed:  10/19/2021  Updated Objective Findings:  None Today  TREATMENT:     THERAPEUTIC EXERCISE: (20 minutes):  Exercises per grid below to improve mobility and strength. Required minimal verbal cues to promote proper body alignment and promote proper body posture. Progressed resistance and repetitions as indicated. MANUAL THERAPY: (15 minutes): Soft tissue work through [employer] and soleus was utilized and necessary because of the patient's restricted motion of soft tissue. MODALITIES: (8 minutes):      Ultrasound to R calf x8 minutes @1.5 W/cm2. Skin clear afterwards.       Date:  10-05-21 Date:  10-12-21 Date:  10-19-21   Activity/Exercise Parameters Parameters    Standing Gastroc Stretch On Slantboard  3 reps  30 sec holds On Slantboard  3 reps  30 sec holds On Slantboard  3 reps  30 sec holds   Standing Soleus Stretch  On Slantboard 3 reps 30 sec holds On Slantboard  3 reps  30 sec holds   Airdyne 7 minutes 7 minutes 7 minutes   Standing Heel/Toe Raises 20 reps each 20 reps each 30 reps each   SLS R LE On Blue Foam  5 reps to Fatigue On Blue Foam  5 reps to Fatigue On Blue Foam  5 reps to Fatigue   T-band Ankle DF, PF, IV and EV  Green T-band  20 reps each    Kinesiotaping Y-Strip for Inhibition R General Mishra Black- 5 minutes       [employer] Portal  Treatment/Session Summary:    Response to Treatment:  Pt tolerated all treatments well today with no c/o. Pain improving. Communication/Consultation:  None today  Equipment provided today:  None today  Recommendations/Intent for next treatment session: Next visit will focus on progression of stretching/strengthening exercises as tolerable.     Total Treatment Billable Duration:  43 minutes  PT Patient Time In/Time Out  Time In: 1515  Time Out: 300 [facility], [facility]   Visit # Therapist initials Date Arrived NS/ Cx < 24 hr >24 hr Cx Visit Comments   1  09-27-21 X   12 visits authorized   2  10-05-21 X      3  10-07-21 X      4  10-14-21 X      5 [facility] 10-19-21 x                                                                                                                             Abbreviations: NS = No Show; CX = cancelled      Future Appointments   Date Time Provider Burke Vu   10/19/2021  3:15 PM Sergio William PTA [facility]   10/20/2021  8:45 AM Alivia Chris MD [facility]   10/21/2021  3:15 PM Janny Krishna, PT [facility]   10/26/2021  3:15 PM Sergio William PTA [facility]   10/28/2021  9:30 AM Elen Louise, JUSTEN [facility]

## 2021-10-21 ENCOUNTER — HOSPITAL ENCOUNTER (OUTPATIENT)
Dept: PHYSICAL THERAPY | Age: 51
Discharge: HOME | End: 2021-10-21
Attending: ORTHOPAEDIC SURGERY
Payer: OTHER MISCELLANEOUS

## 2021-10-21 PROCEDURE — 97035 APP MDLTY 1+ULTRASOUND EA 15: CPT

## 2021-10-21 PROCEDURE — 97110 THERAPEUTIC EXERCISES: CPT

## 2021-10-21 PROCEDURE — 97140 MANUAL THERAPY 1/> REGIONS: CPT

## 2021-10-21 NOTE — PROGRESS NOTES
Jackie Mini  : 1970  Primary: Isaiah Pagan Generic Workers Compens*  Secondary:  Therapy Center at [address] [place] [address],[address], [address] [facility]  [phone]   [phone]         OUTPATIENT PHYSICAL THERAPY: Daily Treatment Note 10/21/2021  Visit Count:  7    ICD-10: Treatment Diagnosis: Pain in right lower leg (M79.661)  Precautions/Allergies:   Patient has no known allergies. TREATMENT PLAN:  Effective Dates: 2021 TO 2021 (90 days). Frequency/Duration: 2 times a week for 90 Day(s)    Pre-treatment Symptoms/Complaints:  R lower leg pain; Pt states his pain is much better. He saw MD for follow up and MD told him to complete his last 3 PT sessions and then he could be discharged. Pain: Initial:  1/10 Post Session:  1/10   Medications Last Reviewed:  10/21/2021  Updated Objective Findings:  None Today  TREATMENT:     THERAPEUTIC EXERCISE: (20 minutes):  Exercises per grid below to improve mobility and strength. Required minimal verbal cues to promote proper body alignment and promote proper body posture. Progressed resistance and repetitions as indicated. MANUAL THERAPY: (15 minutes): Soft tissue work through [employer] and soleus was utilized and necessary because of the patient's restricted motion of soft tissue. MODALITIES: (8 minutes):      Ultrasound to R calf x8 minutes @1.5 W/cm2. Skin clear afterwards.       Date:  10-05-21 Date:  10-12-21 Date:  10-19-21 Date:  10-21-21   Activity/Exercise Parameters Parameters     Standing Gastroc Stretch On Slantboard  3 reps  30 sec holds On Slantboard  3 reps  30 sec holds On Slantboard  3 reps  30 sec holds On Slantboard  3 reps  30 sec holds   Standing Soleus Stretch  On Slantboard 3 reps 30 sec holds On Slantboard  3 reps  30 sec holds On Slantboard  3 reps  30 sec holds   Airdyne 7 minutes 7 minutes 7 minutes 8 minutes   Standing Heel/Toe Raises 20 reps each 20 reps each 30 reps each 30 reps each SLS R LE On Blue Foam  5 reps to Fatigue On Blue Foam  5 reps to Fatigue On Blue Foam  5 reps to Fatigue On Blue Foam  5 reps to Fatigue   T-band Ankle DF, PF, IV and EV  Green T-band  20 reps each  Green T-band  20 reps each   Kinesiotaping Y-Strip for Inhibition R Gastoc  Black- 5 minutes        [employer] Portal  Treatment/Session Summary:    Response to Treatment:  Pt tolerated all treatments well today with no c/o. Discharge after next week. Communication/Consultation:  None today  Equipment provided today:  None today  Recommendations/Intent for next treatment session: Next visit will focus on progression of stretching/strengthening exercises as tolerable.     Total Treatment Billable Duration:  43 minutes  PT Patient Time In/Time Out  Time In: 9425  Time Out: [address] [facility] Ashly Jonas PT   Visit # Therapist initials Date Arrived NS/ Cx < 24 hr >24 hr Cx Visit Comments   1  09-27-21 X   12 visits authorized   2  10-05-21 X      3  10-07-21 X      4  10-14-21 X      5 [facility] 10-19-21 x      6  10-21-21 X                                                                                                                    Abbreviations: NS = No Show; CX = cancelled      Future Appointments   Date Time Provider Burke Vu   10/26/2021  3:15 PM Christiano Murillo PTA [facility]   10/28/2021  9:30 AM [facility], JUSTEN MariE

## 2021-10-26 ENCOUNTER — HOSPITAL ENCOUNTER (OUTPATIENT)
Dept: PHYSICAL THERAPY | Age: 51
Discharge: HOME | End: 2021-10-26
Attending: ORTHOPAEDIC SURGERY
Payer: OTHER MISCELLANEOUS

## 2021-10-26 PROCEDURE — 97035 APP MDLTY 1+ULTRASOUND EA 15: CPT

## 2021-10-26 PROCEDURE — 97140 MANUAL THERAPY 1/> REGIONS: CPT

## 2021-10-26 PROCEDURE — 97110 THERAPEUTIC EXERCISES: CPT

## 2021-10-26 NOTE — PROGRESS NOTES
Mel Bethea  : 1970  Primary: Sander Magana Generic Workers Compens*  Secondary:  Therapy Center at [place] [id number] [facility], [address] [place] [address],[address], [facility] [address]  [phone]   [phone]         OUTPATIENT PHYSICAL THERAPY: Daily Treatment Note 10/26/2021  Visit Count:  8    ICD-10: Treatment Diagnosis: Pain in right lower leg (M79.661)  Precautions/Allergies:   Patient has no known allergies. TREATMENT PLAN:  Effective Dates: 2021 TO 2021 (90 days). Frequency/Duration: 2 times a week for 90 Day(s)    Pre-treatment Symptoms/Complaints:  R lower leg pain; Pt states his pain is much better. He saw MD for follow up and MD told him to complete his last 3 PT sessions and then he could be discharged. Pain: Initial:  1/10 Post Session:  1/10   Medications Last Reviewed:  10/26/2021  Updated Objective Findings:  None Today  TREATMENT:     THERAPEUTIC EXERCISE: (20 minutes):  Exercises per grid below to improve mobility and strength. Required minimal verbal cues to promote proper body alignment and promote proper body posture. Progressed resistance and repetitions as indicated. MANUAL THERAPY: (15 minutes): Soft tissue work through [employer] and soleus was utilized and necessary because of the patient's restricted motion of soft tissue. MODALITIES: (8 minutes):      Ultrasound to R calf x8 minutes @1.5 W/cm2. Skin clear afterwards.       Date:  10-05-21 Date:  10-12-21 Date:  10-19-21 Date:  10-26-21   Activity/Exercise Parameters Parameters     Standing Gastroc Stretch On Slantboard  3 reps  30 sec holds On Slantboard  3 reps  30 sec holds On Slantboard  3 reps  30 sec holds On Slantboard  3 reps  30 sec holds   Standing Soleus Stretch  On Slantboard 3 reps 30 sec holds On Slantboard  3 reps  30 sec holds On Slantboard  3 reps  30 sec holds   Airdyne 7 minutes 7 minutes 7 minutes 8 minutes   Standing Heel/Toe Raises 20 reps each 20 reps each 30 reps each 30 reps each SLS R LE On Blue Foam  5 reps to Fatigue On Blue Foam  5 reps to Fatigue On Blue Foam  5 reps to Fatigue On Blue Foam  5 reps to Fatigue   T-band Ankle DF, PF, IV and EV  Green T-band  20 reps each  Green T-band  20 reps each   Kinesiotaping Y-Strip for Inhibition R Gastoc  Black- 5 minutes        [employer] Portal  Treatment/Session Summary:    Response to Treatment:  Pt tolerated all treatments well today with no c/o. One more appt then Discharge. Communication/Consultation:  None today  Equipment provided today:  None today  Recommendations/Intent for next treatment session: Next visit will focus on progression of stretching/strengthening exercises as tolerable.     Total Treatment Billable Duration:  43 minutes  PT Patient Time In/Time Out  Time In: 0145  Time Out: 300 [facility]   Visit # Therapist initials Date Arrived NS/ Cx < 24 hr >24 hr Cx Visit Comments   1  09-27-21 X   12 visits authorized   2  10-05-21 X      3  10-07-21 X      4  10-14-21 X      5 [facility] 10-19-21 x      6  10-21-21 X      7 [facility] 10-26-21 x                                                                                                           Abbreviations: NS = No Show; CX = cancelled      Future Appointments   Date Time Provider Burke Vu   10/26/2021  3:15 PM Lalitha Gutierrez PTA [facility]   10/28/2021  9:30 AM [facility]

## 2021-10-28 ENCOUNTER — HOSPITAL ENCOUNTER (OUTPATIENT)
Dept: PHYSICAL THERAPY | Age: 51
Discharge: HOME | End: 2021-10-28
Attending: ORTHOPAEDIC SURGERY
Payer: OTHER MISCELLANEOUS

## 2021-10-28 PROCEDURE — 97110 THERAPEUTIC EXERCISES: CPT

## 2021-10-28 PROCEDURE — 97035 APP MDLTY 1+ULTRASOUND EA 15: CPT

## 2021-10-28 PROCEDURE — 97140 MANUAL THERAPY 1/> REGIONS: CPT

## 2021-10-28 NOTE — PROGRESS NOTES
Ceferino Elmore  : 1970  Primary: Henok Ward Generic Workers Compens*  Secondary:  Therapy Center at [address] [place] [address], [address] [place] [address],[address], [place] [address].  [phone]   [phone]         OUTPATIENT PHYSICAL THERAPY: Daily Treatment Note and DISCHARGE 10/28/2021  Visit Count:  9    ICD-10: Treatment Diagnosis: Pain in right lower leg (M79.661)  Precautions/Allergies:   Patient has no known allergies. TREATMENT PLAN:  Effective Dates: 2021 TO 2021 (90 days). Frequency/Duration: 2 times a week for 90 Day(s)    Pre-treatment Symptoms/Complaints:  R lower leg pain; Pt states his pain is much better. \"Doing better\"  Pain: Initial:  1/10 Post Session:  1/10   Medications Last Reviewed:  10/28/2021  Updated Objective Findings:  None Today  TREATMENT:     THERAPEUTIC EXERCISE: (20 minutes):  Exercises per grid below to improve mobility and strength. Required minimal verbal cues to promote proper body alignment and promote proper body posture. Progressed resistance and repetitions as indicated. MANUAL THERAPY: (15 minutes): Soft tissue work through [employer] and soleus was utilized and necessary because of the patient's restricted motion of soft tissue. MODALITIES: (8 minutes):      Ultrasound to R calf x8 minutes @1.5 W/cm2. Skin clear afterwards. Date:  10-28-21   Activity/Exercise    Standing Gastroc Stretch On Slantboard  3 reps  30 sec holds   Standing Soleus Stretch On Slantboard  3 reps  30 sec holds   Airdyne 8 minutes   Standing Heel/Toe Raises 30 reps each   SLS R LE On Blue Foam  5 reps to Fatigue   T-band Ankle DF, PF, IV and EV Green T-band  20 reps each   Kinesiotaping Y-Strip for Inhibition R Gastoc        [employer] Portal  Treatment/Session Summary:    Response to Treatment:  Pt tolerated all treatments well today with no c/o. Last appointment today. Good progress overall.   Communication/Consultation:  None today  Equipment provided today: None today  Recommendations/Intent for next treatment session: Next visit will focus on progression of stretching/strengthening exercises as tolerable.     Total Treatment Billable Duration:  43 minutes  PT Patient Time In/Time Out  Time In: 0930  Time Out: [address], PTA   Visit # Therapist initials Date Arrived NS/ Cx < 24 hr >24 hr Cx Visit Comments   1 BW 09-27-21 X   12 visits authorized   2  10-05-21 X      3  10-07-21 X      4  10-14-21 X      5 rjk 10-19-21 x      6 BW 10-21-21 X      7 rjk 10-26-21 x      8 rjk 10-28-21 x                                                                                                  Abbreviations: NS = No Show; CX = cancelled      Future Appointments   Date Time Provider Burke Vu   10/28/2021  9:30 AM Timmy Son PTA [facility] [place]

## 2021-11-02 NOTE — THERAPY DISCHARGE
Sanjiv Guidry  : 1970  Primary: Perla Casillas Generic Workers Compens*  Secondary:  Therapy Center at [address], [address] [place] [address],[address], [place] [address].  [phone]   [phone]           OUTPATIENT PHYSICAL THERAPY:Discharge Summary 2021   ICD-10: Treatment Diagnosis: Pain in right lower leg (M79.661)  Precautions/Allergies:   Patient has no known allergies. TREATMENT PLAN:  Effective Dates: 2021 TO 2021 (90 days). Frequency/Duration: 2 times a week for 90 Day(s) MEDICAL/REFERRING DIAGNOSIS:  pt   DATE OF ONSET: 21  REFERRING PHYSICIAN: Gale Gorman MD MD Orders: Evaluate and Treat  Return MD Appointment: Unknown at this time     INITIAL ASSESSMENT:  Mr. Francine Greene was last seen for PT on 10-28-21. He was doing well on that visit. Discharge from PT.       PROBLEM LIST (Impacting functional limitations):  Decreased Strength  Decreased ADL/Functional Activities  Increased Pain  Decreased Flexibility/Joint Mobility  Decreased [place] with Home Exercise Program INTERVENTIONS PLANNED: (Treatment may consist of any combination of the following)  Balance Exercise  Cold  Cryotherapy  Electrical Stimulation  Heat  Home Exercise Program (HEP)  Manual Therapy  Range of Motion (ROM)  Therapeutic Exercise/Strengthening  Ultrasound (US)     GOALS: (Goals have been discussed and agreed upon with patient.)  Short-Term Functional Goals: Time Frame: 4 weeks  Pt will increase ROM R ankle DF = 10 degrees to assist with daily activities  Pt will increase LEFS score to 70 or above to show overall improvement in function  Pt will be independent with HEP  Discharge Goals: Time Frame: 12 weeks  Pt will increase strength R ankle 5/5 to assist with daily activities  Pt will ascend/descend 10 eight-inch steps independently using a reciprocal pattern with no c/o R leg pain  Pt will work 8 hr workday full duty with no c/o R leg pain    OUTCOME MEASURE:   Tool Used: Lower Extremity Functional Scale (LEFS)  Score:  Initial: 60/80 Most Recent: X/80 (Date: -- )   Interpretation of Score: 20 questions each scored on a 5 point scale with 0 representing \"extreme difficulty or unable to perform\" and 4 representing \"no difficulty\". The lower the score, the greater the functional disability. 80/80 represents no disability. Minimal detectable change is 9 points. MEDICAL NECESSITY:   Patient is expected to demonstrate progress in strength, range of motion and functional technique to increase independence with work and daily activities. Patient demonstrates good rehab potential due to higher previous functional level. REASON FOR SERVICES/OTHER COMMENTS:  Patient continues to require skilled intervention due to decreased ROM/strength R ankle with increased pain leading to decreased functional status. Total Duration:  PT Patient Time In/Time Out  Time In: 1500  Time Out: 1550    Rehabilitation Potential For Stated Goals: Good  Regarding RhettWIL Lovell's therapy, I certify that the treatment plan above will be carried out by a therapist or under their direction. Thank you for this referral,  Kelton Estevez, PT     Referring Physician Signature: Richard Mcgill MD _______________________________ Date _____________      PAIN/SUBJECTIVE:   Initial:   8/10 Post Session:  8/10   HISTORY:   History of Injury/Illness (Reason for Referral):  Pt states 07-19-21 he was at work when he fell. He states there was a leak and he slipped on wet tile and fell onto his R leg. He had surgery on his L knee last year and was trying to protect his L knee and injured his R leg. Pt had a doppler ultrasound which was negative for DVT/blood clot. He had an MRI which revealed a small superficial tear of the lateral aspect of the soleus muscle belly. Aggravating factors include prolonged standing and walking. Relieving factors include massage.   Pt rates his current R leg pain 8/10 sitting at rest.  He works in maintenance for [employer]. He is using [employer] which he states helps some. Pt states he is doing his regular work, but has increased pain with his work activities. Pt states his pain is slowly improving. Pt is taking Naproxen prn for his R lower leg pain. He states he occasionally gets pain into the R knee and ankle. Past Medical History/Comorbidities:   Mr. Wing Souza  has a past medical history of Diabetes ([address] Hypercholesterolemia, Hyperlipemia, Left knee pain, and Nausea & vomiting. Mr. Wing Souza  has a past surgical history that includes hx orthopaedic; hx orthopaedic (Left, 2012); and hx orthopaedic (Right, 2004). Social History/Living Environment:     Pt lives in a 3rd floor apartment and has some mild pain with ascending/descending stairs  Prior Level of Function/Work/Activity:  Pt works in maintenance for [employer]:         RIGHT  Other Clinical Tests:          MRI revealed superficial tear of R soleus   Personal Factors:          Sex:  male        Age:  49 y.o. Profession:  Pt works in maintenance for [employer]   Ambulatory/Rehab [address] [place] [address] Risk Assessment   Risk Factors:       (1)  Gender [Male]       (5)  History of Recent Falls [w/in 3 months] Ability to Rise from Chair:       (0)  Ability to rise in a single movement   Falls Prevention Plan:       Exercise/Equipment Adaptation (specify):  SBA to CGA during standing exercise   Total: (5 or greater = High Risk): 6   ©2010 [facility] [place] [address] [place] [address] States Patent #[id number]. Federal Law prohibits the replication, distribution or use without written permission from [facility] [employer]   Current Medications:       Current Outpatient Medications:     naproxen (NAPROSYN) 500 mg tablet, Take 500 mg by mouth two (2) times daily as needed. , Disp: , Rfl:     insulin detemir U-100 (Levemir U-100 Insulin) 100 unit/mL injection, 60 Units by SubCUTAneous route nightly., Disp: , Rfl:     metFORMIN (GLUCOPHAGE) 500 mg tablet, Take 1,000 mg by mouth two (2) times daily (with meals). , Disp: , Rfl:     insulin aspart U-100 (NovoLOG Flexpen U-100 Insulin) 100 unit/mL (3 mL) inpn, by SubCUTAneous route Before breakfast, lunch, and dinner. 6 units in am 10 units at lunch 5-10 units with dinner, Disp: , Rfl:     lisinopril-hydroCHLOROthiazide (PRINZIDE, ZESTORETIC) 20-12.5 mg per tablet, Take 1 Tab by mouth daily. , Disp: , Rfl:     amLODIPine (NORVASC) 10 mg tablet, Take 10 mg by mouth daily. , Disp: , Rfl:     atorvastatin (LIPITOR) 20 mg tablet, Take 20 mg by mouth nightly., Disp: , Rfl:     glipiZIDE SR (GLUCOTROL XL) 5 mg CR tablet, Take 1 tablet by mouth daily. , Disp: 30 tablet, Rfl: 0    metFORMIN (GLUCOPHAGE) 500 mg tablet, Take 1 tablet by mouth two (2) times daily (with meals). , Disp: 60 tablet, Rfl: 0   Date Last Reviewed:  09-27-21   Number of Personal Factors/Comorbidities that affect the Plan of Care: 1-2: MODERATE COMPLEXITY   EXAMINATION:   Observation/Orthostatic Postural Assessment:           Forward head, rounded shoulders  Palpation:          Tenderness and hypertonicity R gastroc/soleus  ROM:    R knee extension = 0 degrees  R knee flexion = 125 degrees    L knee extension = 0 degrees  L knee flexion = 125 degrees    R ankle DF = 4 degrees  R ankle PF = 60 degrees  R ankle IV = 40 degrees  R ankle EV = 30 degrees    L ankle DF = 10 degrees  L ankle PF = 60 degrees  L ankle IV = 45 degrees  L ankle EV = 35 degrees    Strength:    R knee extension = 5/5  R knee flexion = 5/5    L knee extension = 5/5  L knee flexion = 5/5    R ankle DF = 4+/5  R ankle PF = 4+/5  R ankle IV = 4+/5  R ankle EV = 4+/5    L ankle DF = 5/5  L ankle PF = 5/5  L ankle IV = 5/5  L ankle EV = 5/5    Neurological Screen:        Sensation:  Intact to light touch R LE  Functional Mobility:         Gait/Ambulation:  Pt walks with minimal antalgic gait        Transfers:  Pt able to transition sit to supine and supine to sit independently    Body Structures Involved:  Muscles Body Functions Affected:  Sensory/Pain  Neuromusculoskeletal Activities and Participation Affected:   Mobility  Domestic Life   Number of elements (examined above) that affect the Plan of Care: 3: MODERATE COMPLEXITY   CLINICAL PRESENTATION:   Presentation: Evolving clinical presentation with changing clinical characteristics: MODERATE COMPLEXITY   CLINICAL DECISION MAKING:   Use of outcome tool(s) and clinical judgement create a POC that gives a: Questionable prediction of patient's progress: MODERATE COMPLEXITY

## (undated) DEVICE — SYR LR LCK 1ML GRAD NSAF 30ML --

## (undated) DEVICE — INTENDED FOR TISSUE SEPARATION, AND OTHER PROCEDURES THAT REQUIRE A SHARP SURGICAL BLADE TO PUNCTURE OR CUT.: Brand: BARD-PARKER ® STAINLESS STEEL BLADES

## (undated) DEVICE — SET IRRIG DST FLX M CONN

## (undated) DEVICE — NEEDLE HYPO 18GA L1.5IN PNK S STL HUB POLYPR SHLD REG BVL

## (undated) DEVICE — BANDAGE COBAN 6 IN WND 6INX5YD FOAM

## (undated) DEVICE — STERILE HOOK LOCK LATEX FREE ELASTIC BANDAGE 6INX5YD: Brand: HOOK LOCK™

## (undated) DEVICE — AMD ANTIMICROBIAL GAUZE SPONGES,12 PLY USP TYPE VII, 0.2% POLYHEXAMETHYLENE BIGUANIDE HCI (PHMB): Brand: CURITY

## (undated) DEVICE — DRESSING,GAUZE,XEROFORM,CURAD,1"X8",ST: Brand: CURAD

## (undated) DEVICE — PADDING CAST W4INXL4YD ST COT COHESIVE HND TEARABLE SPEC

## (undated) DEVICE — BLADE SHV DIA4MM RED RESECT FOR GEN DEB REM OF PERIOST FR

## (undated) DEVICE — SET IRRIG L94IN DIA0.281IN L BOR W/ 2 N VENT SPIK 2 ON/OFF

## (undated) DEVICE — (D)PREP SKN CHLRAPRP APPL 26ML -- CONVERT TO ITEM 371833

## (undated) DEVICE — 4-PORT MANIFOLD: Brand: NEPTUNE 2

## (undated) DEVICE — WEREWOLF FLOW 50 COBLATION WAND: Brand: COBLATION

## (undated) DEVICE — ZIMMER® STERILE DISPOSABLE TOURNIQUET CUFF WITH PLC, DUAL PORT, SINGLE BLADDER, 34 IN. (86 CM)

## (undated) DEVICE — SOLUTION IRRIG 3000ML 0.9% SOD CHL FLX CONT 0797208] ICU MEDICAL INC]

## (undated) DEVICE — KNEE ARTHRO LEE-ROBERSON: Brand: MEDLINE INDUSTRIES, INC.

## (undated) DEVICE — T-DRAPE,EXTREMITY,STERILE: Brand: MEDLINE

## (undated) DEVICE — [AGGRESSIVE 6-FLUTE ROUND BUR, ARTHROSCOPIC SHAVER BLADE,  DO NOT RESTERILIZE,  DO NOT USE IF PACKAGE IS DAMAGED,  KEEP DRY,  KEEP AWAY FROM SUNLIGHT]: Brand: FORMULA

## (undated) DEVICE — STOCKINETTE,IMPERVIOUS,12X48,STERILE: Brand: MEDLINE